# Patient Record
Sex: FEMALE | Race: WHITE | NOT HISPANIC OR LATINO | URBAN - METROPOLITAN AREA
[De-identification: names, ages, dates, MRNs, and addresses within clinical notes are randomized per-mention and may not be internally consistent; named-entity substitution may affect disease eponyms.]

---

## 2017-12-19 ENCOUNTER — OUTPATIENT (OUTPATIENT)
Dept: OUTPATIENT SERVICES | Facility: HOSPITAL | Age: 72
LOS: 1 days | End: 2017-12-19
Payer: MEDICARE

## 2017-12-19 VITALS
SYSTOLIC BLOOD PRESSURE: 124 MMHG | HEART RATE: 66 BPM | RESPIRATION RATE: 16 BRPM | TEMPERATURE: 98 F | WEIGHT: 126.1 LBS | HEIGHT: 60 IN | DIASTOLIC BLOOD PRESSURE: 80 MMHG

## 2017-12-19 DIAGNOSIS — M25.531 PAIN IN RIGHT WRIST: ICD-10-CM

## 2017-12-19 DIAGNOSIS — Z98.890 OTHER SPECIFIED POSTPROCEDURAL STATES: Chronic | ICD-10-CM

## 2017-12-19 DIAGNOSIS — I10 ESSENTIAL (PRIMARY) HYPERTENSION: ICD-10-CM

## 2017-12-19 DIAGNOSIS — M25.539 PAIN IN UNSPECIFIED WRIST: ICD-10-CM

## 2017-12-19 DIAGNOSIS — E03.9 HYPOTHYROIDISM, UNSPECIFIED: ICD-10-CM

## 2017-12-19 LAB
BUN SERPL-MCNC: 26 MG/DL — HIGH (ref 7–23)
CALCIUM SERPL-MCNC: 9.2 MG/DL — SIGNIFICANT CHANGE UP (ref 8.4–10.5)
CHLORIDE SERPL-SCNC: 101 MMOL/L — SIGNIFICANT CHANGE UP (ref 98–107)
CO2 SERPL-SCNC: 26 MMOL/L — SIGNIFICANT CHANGE UP (ref 22–31)
CREAT SERPL-MCNC: 0.93 MG/DL — SIGNIFICANT CHANGE UP (ref 0.5–1.3)
GLUCOSE SERPL-MCNC: 94 MG/DL — SIGNIFICANT CHANGE UP (ref 70–99)
HCT VFR BLD CALC: 37.9 % — SIGNIFICANT CHANGE UP (ref 34.5–45)
HGB BLD-MCNC: 12.4 G/DL — SIGNIFICANT CHANGE UP (ref 11.5–15.5)
MCHC RBC-ENTMCNC: 29.7 PG — SIGNIFICANT CHANGE UP (ref 27–34)
MCHC RBC-ENTMCNC: 32.7 % — SIGNIFICANT CHANGE UP (ref 32–36)
MCV RBC AUTO: 90.7 FL — SIGNIFICANT CHANGE UP (ref 80–100)
NRBC # FLD: 0 — SIGNIFICANT CHANGE UP
PLATELET # BLD AUTO: 245 K/UL — SIGNIFICANT CHANGE UP (ref 150–400)
PMV BLD: 10.8 FL — SIGNIFICANT CHANGE UP (ref 7–13)
POTASSIUM SERPL-MCNC: 4.1 MMOL/L — SIGNIFICANT CHANGE UP (ref 3.5–5.3)
POTASSIUM SERPL-SCNC: 4.1 MMOL/L — SIGNIFICANT CHANGE UP (ref 3.5–5.3)
RBC # BLD: 4.18 M/UL — SIGNIFICANT CHANGE UP (ref 3.8–5.2)
RBC # FLD: 12.5 % — SIGNIFICANT CHANGE UP (ref 10.3–14.5)
SODIUM SERPL-SCNC: 141 MMOL/L — SIGNIFICANT CHANGE UP (ref 135–145)
WBC # BLD: 7.27 K/UL — SIGNIFICANT CHANGE UP (ref 3.8–10.5)
WBC # FLD AUTO: 7.27 K/UL — SIGNIFICANT CHANGE UP (ref 3.8–10.5)

## 2017-12-19 PROCEDURE — 93010 ELECTROCARDIOGRAM REPORT: CPT

## 2017-12-19 NOTE — H&P PST ADULT - NEGATIVE OPHTHALMOLOGIC SYMPTOMS
no diplopia/no pain R/no lacrimation L/no pain L/no photophobia no pain L/no lacrimation L/no lacrimation R/no diplopia/no photophobia/no pain R

## 2017-12-19 NOTE — H&P PST ADULT - NEGATIVE CARDIOVASCULAR SYMPTOMS
no dyspnea on exertion/no claudication/no palpitations/no orthopnea/no paroxysmal nocturnal dyspnea/no peripheral edema/no chest pain

## 2017-12-19 NOTE — H&P PST ADULT - LYMPHATIC
supraclavicular L/supraclavicular R/anterior cervical L/anterior cervical R/posterior cervical L/posterior cervical R

## 2017-12-19 NOTE — H&P PST ADULT - NEGATIVE MUSCULOSKELETAL SYMPTOMS
no joint swelling/no myalgia/no arm pain L/no arthralgia/no muscle cramps/no muscle weakness/no back pain/no leg pain L/no stiffness/no neck pain/no leg pain R

## 2017-12-19 NOTE — H&P PST ADULT - PROBLEM SELECTOR PLAN 1
Pt is scheduled for right distal radius osteotomy on 1/3/18.  Pre op instructions including chlorhexidine wash given and pt able to verbalize understanding.

## 2017-12-19 NOTE — H&P PST ADULT - NEGATIVE NEUROLOGICAL SYMPTOMS
no vertigo/no difficulty walking/no transient paralysis/no generalized seizures/no loss of sensation/no weakness/no paresthesias

## 2017-12-19 NOTE — H&P PST ADULT - HISTORY OF PRESENT ILLNESS
Pt reports she sustained a right wrist fracture 3 years ago, medically treated.  Pt reports she started to experience right wrist pain, progressively getting worse.  Pt reports right wrist xray done, revealed that previous wrist fracture did not heal properly.  Pt is scheduled for right distal radius osteotomy on 1/3/18. 71 yo female with PMH hypertension and hypothyroidism presents to pre surgical testing.  Pt reports she sustained a right wrist fracture 3 years ago, and was medically treated.  Pt reports she started to experience reoccurrence of right wrist pain 6 months ago, progressively getting worse.  Pt reports right wrist xray done, revealed that previous wrist fracture did not heal properly.  Pt is scheduled for right distal radius osteotomy on 1/3/18.

## 2018-01-03 ENCOUNTER — OUTPATIENT (OUTPATIENT)
Dept: OUTPATIENT SERVICES | Facility: HOSPITAL | Age: 73
LOS: 1 days | Discharge: HOME | End: 2018-01-03

## 2018-01-03 VITALS
DIASTOLIC BLOOD PRESSURE: 80 MMHG | WEIGHT: 126.1 LBS | HEART RATE: 66 BPM | SYSTOLIC BLOOD PRESSURE: 124 MMHG | TEMPERATURE: 98 F | OXYGEN SATURATION: 98 % | RESPIRATION RATE: 16 BRPM | HEIGHT: 60 IN

## 2018-01-03 VITALS — HEART RATE: 80 BPM | OXYGEN SATURATION: 99 % | DIASTOLIC BLOOD PRESSURE: 67 MMHG | SYSTOLIC BLOOD PRESSURE: 150 MMHG

## 2018-01-03 DIAGNOSIS — Z98.890 OTHER SPECIFIED POSTPROCEDURAL STATES: Chronic | ICD-10-CM

## 2018-01-03 DIAGNOSIS — M25.531 PAIN IN RIGHT WRIST: ICD-10-CM

## 2018-01-03 RX ORDER — OXYCODONE AND ACETAMINOPHEN 5; 325 MG/1; MG/1
2 TABLET ORAL
Qty: 48 | Refills: 0
Start: 2018-01-03 | End: 2018-01-08

## 2018-01-03 NOTE — ASU DISCHARGE PLAN (ADULT/PEDIATRIC). - NOTIFY
Numbness, color, or temperature change to extremity/Pain not relieved by Medications/Fever greater than 101/Persistent Nausea and Vomiting/Swelling that continues/Bleeding that does not stop

## 2018-01-03 NOTE — BRIEF OPERATIVE NOTE - PRE-OP DX
Radius and ulna distal fracture, right, closed, with malunion, subsequent encounter  01/03/2018    Active  Jad Santamaria

## 2018-01-03 NOTE — BRIEF OPERATIVE NOTE - PROCEDURE
<<-----Click on this checkbox to enter Procedure Radius osteotomy  01/03/2018  RIGHT RADIUS  Active  RROSSI

## 2020-02-04 NOTE — ASU DISCHARGE PLAN (ADULT/PEDIATRIC). - SPECIAL INSTRUCTIONS
[FreeTextEntry1] : 1) CKD IV\par 2) HTN\par 3) DM\par 4) Gout\par 5) Anemia of CKD\par \par Patient has CKD IV in the setting of long standing uncontrolled DM , obesity and HTN\par GN work up negative except for + ISHAN\par Labs reviewed; Scr 2.1 , K+ and serum CO2 wnl\par Hb low; will give UDAY injection next visit\par BP stable; continue Amlodipine and Losartan 100 mg daily\par Needs tight glycemic control and weight loss to halt progression of CKD\par RTC in 6 months
Please refer to Dr. Guajardo instruction sheet

## 2023-01-20 PROBLEM — E03.9 HYPOTHYROIDISM, UNSPECIFIED: Chronic | Status: ACTIVE | Noted: 2017-12-19

## 2023-01-20 PROBLEM — M25.539 PAIN IN UNSPECIFIED WRIST: Chronic | Status: ACTIVE | Noted: 2017-12-19

## 2023-01-20 PROBLEM — I10 ESSENTIAL (PRIMARY) HYPERTENSION: Chronic | Status: ACTIVE | Noted: 2017-12-19

## 2023-01-23 ENCOUNTER — OUTPATIENT (OUTPATIENT)
Dept: OUTPATIENT SERVICES | Facility: HOSPITAL | Age: 78
LOS: 1 days | End: 2023-01-23
Payer: COMMERCIAL

## 2023-01-23 VITALS
RESPIRATION RATE: 18 BRPM | DIASTOLIC BLOOD PRESSURE: 70 MMHG | TEMPERATURE: 98 F | HEART RATE: 57 BPM | HEIGHT: 60 IN | SYSTOLIC BLOOD PRESSURE: 179 MMHG | WEIGHT: 126.1 LBS

## 2023-01-23 DIAGNOSIS — S46.009A UNSPECIFIED INJURY OF MUSCLE(S) AND TENDON(S) OF THE ROTATOR CUFF OF UNSPECIFIED SHOULDER, INITIAL ENCOUNTER: ICD-10-CM

## 2023-01-23 DIAGNOSIS — G56.02 CARPAL TUNNEL SYNDROME, LEFT UPPER LIMB: ICD-10-CM

## 2023-01-23 DIAGNOSIS — Z98.890 OTHER SPECIFIED POSTPROCEDURAL STATES: Chronic | ICD-10-CM

## 2023-01-23 DIAGNOSIS — R52 PAIN, UNSPECIFIED: ICD-10-CM

## 2023-01-23 LAB
ALBUMIN SERPL ELPH-MCNC: 4.5 G/DL — SIGNIFICANT CHANGE UP (ref 3.3–5)
ALP SERPL-CCNC: 34 U/L — LOW (ref 40–120)
ALT FLD-CCNC: 18 U/L — SIGNIFICANT CHANGE UP (ref 4–33)
ANION GAP SERPL CALC-SCNC: 11 MMOL/L — SIGNIFICANT CHANGE UP (ref 7–14)
AST SERPL-CCNC: 24 U/L — SIGNIFICANT CHANGE UP (ref 4–32)
BILIRUB SERPL-MCNC: 0.4 MG/DL — SIGNIFICANT CHANGE UP (ref 0.2–1.2)
BUN SERPL-MCNC: 20 MG/DL — SIGNIFICANT CHANGE UP (ref 7–23)
CALCIUM SERPL-MCNC: 9.5 MG/DL — SIGNIFICANT CHANGE UP (ref 8.4–10.5)
CHLORIDE SERPL-SCNC: 100 MMOL/L — SIGNIFICANT CHANGE UP (ref 98–107)
CO2 SERPL-SCNC: 27 MMOL/L — SIGNIFICANT CHANGE UP (ref 22–31)
CREAT SERPL-MCNC: 0.9 MG/DL — SIGNIFICANT CHANGE UP (ref 0.5–1.3)
EGFR: 65 ML/MIN/1.73M2 — SIGNIFICANT CHANGE UP
GLUCOSE SERPL-MCNC: 100 MG/DL — HIGH (ref 70–99)
HCT VFR BLD CALC: 37.6 % — SIGNIFICANT CHANGE UP (ref 34.5–45)
HGB BLD-MCNC: 12.2 G/DL — SIGNIFICANT CHANGE UP (ref 11.5–15.5)
MCHC RBC-ENTMCNC: 29.9 PG — SIGNIFICANT CHANGE UP (ref 27–34)
MCHC RBC-ENTMCNC: 32.4 GM/DL — SIGNIFICANT CHANGE UP (ref 32–36)
MCV RBC AUTO: 92.2 FL — SIGNIFICANT CHANGE UP (ref 80–100)
NRBC # BLD: 0 /100 WBCS — SIGNIFICANT CHANGE UP (ref 0–0)
NRBC # FLD: 0 K/UL — SIGNIFICANT CHANGE UP (ref 0–0)
PLATELET # BLD AUTO: 239 K/UL — SIGNIFICANT CHANGE UP (ref 150–400)
POTASSIUM SERPL-MCNC: 3.9 MMOL/L — SIGNIFICANT CHANGE UP (ref 3.5–5.3)
POTASSIUM SERPL-SCNC: 3.9 MMOL/L — SIGNIFICANT CHANGE UP (ref 3.5–5.3)
PROT SERPL-MCNC: 7.2 G/DL — SIGNIFICANT CHANGE UP (ref 6–8.3)
RBC # BLD: 4.08 M/UL — SIGNIFICANT CHANGE UP (ref 3.8–5.2)
RBC # FLD: 12.3 % — SIGNIFICANT CHANGE UP (ref 10.3–14.5)
SODIUM SERPL-SCNC: 138 MMOL/L — SIGNIFICANT CHANGE UP (ref 135–145)
WBC # BLD: 5.06 K/UL — SIGNIFICANT CHANGE UP (ref 3.8–10.5)
WBC # FLD AUTO: 5.06 K/UL — SIGNIFICANT CHANGE UP (ref 3.8–10.5)

## 2023-01-23 PROCEDURE — 93010 ELECTROCARDIOGRAM REPORT: CPT

## 2023-01-23 NOTE — H&P PST ADULT - RESPIRATORY
Balbir Pressley)  Neurology  [address] [place] [address]  Phone: [phone]  Fax: [phone]  Follow Up Time:     Renan Sanchez)  Neurosurgery  [facility], [address]  [place] [address]  Phone: [phone]  Fax: [phone]  Follow Up Time:     Meghan Ag ()  Brain Injury Medicine; PhysicalRehab Medicine  [address] [place] [address]  Phone: [phone]  Fax: [phone]  Follow Up Time:
clear to auscultation bilaterally/no wheezes/no rales/no rhonchi

## 2023-01-23 NOTE — H&P PST ADULT - PROBLEM SELECTOR PLAN 1
This is a 78 y/o female who is scheduled for right wrist tendon transfer and tenosynovectomy on 1-30-23  * Ordered covid-19 pcr; given phone number and places that do covid testing to be done 3-5 days before surgery   * Given preop and cleanser instructions with good teach back and patient verbalized understanding   * Instructed to take normal am dose of levothyroxine and lisinopril - HCTZ the am of surgery This is a 78 y/o female who is scheduled for right wrist tendon transfer and tenosynovectomy on 1-30-23--await to hear from surgical coordinator, Fely [phone] to confirm operative procedure.  * Ordered covid-19 pcr; given phone number and places that do covid testing to be done 3-5 days before surgery   * Given preop and cleanser instructions with good teach back and patient verbalized understanding   * Instructed to take normal am dose of levothyroxine and lisinopril - HCTZ the am of surgery This is a 78 y/o female who is scheduled for right wrist tendon transfer and tenosynovectomy on 1-30-23--await to hear from surgical coordinator, Fely [phone] to confirm operative procedure.  * Ordered covid-19 pcr; given phone number and places that do covid testing to be done 3-5 days before surgery   * Given preop and cleanser instructions with good teach back and patient verbalized understanding   * Instructed to take normal am dose of levothyroxine and lisinopril - HCTZ the am of surgery  ADDENDUM: 1-23-23  Spoke with Fely who confirmed patient is having a right wrist tendon transfer and tenosynovectomy on 1-30-23

## 2023-01-23 NOTE — H&P PST ADULT - HISTORY OF PRESENT ILLNESS
This is a 76 y/o female who presents with injury to right hand tendon. Further intervention warranted. Scheduled for right wrist tendon transfer and tenosynovectomy.

## 2023-01-23 NOTE — H&P PST ADULT - NSICDXPASTMEDICALHX_GEN_ALL_CORE_FT
PAST MEDICAL HISTORY:  Hypercholesterolemia     Hypertension     Hypothyroidism     Wrist pain      PAST MEDICAL HISTORY:  Arthritis     History of injury of tendon     Hypercholesterolemia     Hypertension     Hypothyroidism     Wrist pain

## 2023-01-27 VITALS
DIASTOLIC BLOOD PRESSURE: 73 MMHG | HEART RATE: 65 BPM | WEIGHT: 126.1 LBS | OXYGEN SATURATION: 100 % | HEIGHT: 60 IN | TEMPERATURE: 98 F | SYSTOLIC BLOOD PRESSURE: 158 MMHG | RESPIRATION RATE: 18 BRPM

## 2023-01-27 NOTE — ASU PREOPERATIVE ASSESSMENT, ADULT (IPARK ONLY) - FALL HARM RISK - UNIVERSAL INTERVENTIONS
Bed in lowest position, wheels locked, appropriate side rails in place/Call bell, personal items and telephone in reach/Instruct patient to call for assistance before getting out of bed or chair/Non-slip footwear when patient is out of bed/[place] to call system/Physically safe environment - no spills, clutter or unnecessary equipment/Purposeful Proactive Rounding/Room/bathroom lighting operational, light cord in reach

## 2023-01-30 ENCOUNTER — OUTPATIENT (OUTPATIENT)
Dept: OUTPATIENT SERVICES | Facility: HOSPITAL | Age: 78
LOS: 1 days | Discharge: HOME | End: 2023-01-30

## 2023-01-30 VITALS
OXYGEN SATURATION: 99 % | RESPIRATION RATE: 14 BRPM | SYSTOLIC BLOOD PRESSURE: 128 MMHG | HEART RATE: 68 BPM | TEMPERATURE: 97 F | DIASTOLIC BLOOD PRESSURE: 74 MMHG

## 2023-01-30 DIAGNOSIS — Z98.890 OTHER SPECIFIED POSTPROCEDURAL STATES: Chronic | ICD-10-CM

## 2023-01-30 DIAGNOSIS — G56.02 CARPAL TUNNEL SYNDROME, LEFT UPPER LIMB: ICD-10-CM

## 2023-01-30 RX ORDER — ATORVASTATIN CALCIUM 80 MG/1
1 TABLET, FILM COATED ORAL
Qty: 0 | Refills: 0 | DISCHARGE

## 2023-01-30 RX ORDER — LISINOPRIL/HYDROCHLOROTHIAZIDE 10-12.5 MG
1 TABLET ORAL
Qty: 0 | Refills: 0 | DISCHARGE

## 2023-01-30 RX ORDER — LEVOTHYROXINE SODIUM 125 MCG
1 TABLET ORAL
Qty: 0 | Refills: 0 | DISCHARGE

## 2023-01-30 RX ORDER — OXYCODONE AND ACETAMINOPHEN 5; 325 MG/1; MG/1
1 TABLET ORAL
Qty: 42 | Refills: 0
Start: 2023-01-30 | End: 2023-02-05

## 2023-01-30 NOTE — BRIEF OPERATIVE NOTE - NSICDXBRIEFPOSTOP_GEN_ALL_CORE_FT
POST-OP DIAGNOSIS:  Extensor tendon rupture of hand, unspecified laterality, initial encounter 30-Jan-2023 09:50:23  Edin Gusman

## 2023-01-30 NOTE — BRIEF OPERATIVE NOTE - OPERATION/FINDINGS
R hand EDC to long finger reconstruction with EIP autograft, also reenforcement of EPL with FCR autograft

## 2023-01-30 NOTE — ASU DISCHARGE PLAN (ADULT/PEDIATRIC) - NS MD DC FALL RISK RISK
For information on Fall & Injury Prevention, visit: [email or web address] OR  [email or web address] OR  https://www.cdc.gov/steadi/patient.html

## 2023-01-30 NOTE — ASU DISCHARGE PLAN (ADULT/PEDIATRIC) - NURSING INSTRUCTIONS
Progress to regular diet as tolerated.  Keep well hydrated.   When taking pain/narcotic medications - take with food as it can cause nausea if taken on an empty stomach, and know it may cause constipation. To prevent constipation increase fluids and fiber in diet. You may take stool softeners (such as Colace) and follow directions as printed on bottle. - Do NOT drive while on narcotics.   Next dose of Tylenol may be taken at 2:30pm

## 2024-11-26 NOTE — H&P PST ADULT - CARDIOVASCULAR DETAILS
Age: 85 years old or older/Coagulation: Bleeding disorder either through use of anticoagulants or underlying clinical condition(s)
positive S1/positive S2

## 2025-02-06 PROBLEM — Z00.00 ENCOUNTER FOR PREVENTIVE HEALTH EXAMINATION: Status: ACTIVE | Noted: 2025-02-06

## 2025-03-12 ENCOUNTER — APPOINTMENT (OUTPATIENT)
Age: 80
End: 2025-03-12

## 2025-04-16 ENCOUNTER — APPOINTMENT (OUTPATIENT)
Age: 80
End: 2025-04-16
Payer: MEDICARE

## 2025-04-16 ENCOUNTER — NON-APPOINTMENT (OUTPATIENT)
Age: 80
End: 2025-04-16

## 2025-04-16 PROCEDURE — 92134 CPTRZ OPH DX IMG PST SGM RTA: CPT

## 2025-04-16 PROCEDURE — 92202 OPSCPY EXTND ON/MAC DRAW: CPT

## 2025-04-16 PROCEDURE — 92014 COMPRE OPH EXAM EST PT 1/>: CPT

## 2025-05-05 DIAGNOSIS — M81.0 AGE-RELATED OSTEOPOROSIS W/OUT CURRENT PATHOLOGICAL FRACTURE: ICD-10-CM

## 2025-06-16 ENCOUNTER — APPOINTMENT (OUTPATIENT)
Dept: RHEUMATOLOGY | Facility: CLINIC | Age: 80
End: 2025-06-16

## 2025-06-18 ENCOUNTER — APPOINTMENT (OUTPATIENT)
Dept: RHEUMATOLOGY | Facility: CLINIC | Age: 80
End: 2025-06-18
Payer: MEDICARE

## 2025-06-18 VITALS
DIASTOLIC BLOOD PRESSURE: 67 MMHG | HEART RATE: 78 BPM | TEMPERATURE: 98.7 F | SYSTOLIC BLOOD PRESSURE: 146 MMHG | OXYGEN SATURATION: 95 %

## 2025-06-18 VITALS — SYSTOLIC BLOOD PRESSURE: 128 MMHG | DIASTOLIC BLOOD PRESSURE: 67 MMHG | OXYGEN SATURATION: 99 % | HEART RATE: 69 BPM

## 2025-06-18 PROCEDURE — 96374 THER/PROPH/DIAG INJ IV PUSH: CPT

## (undated) DEVICE — VENODYNE/SCD SLEEVE CALF MEDIUM

## (undated) DEVICE — DRSG DERMABOND 0.7ML

## (undated) DEVICE — DRSG CURITY GAUZE SPONGE 4 X 4" 12-PLY

## (undated) DEVICE — SUT MONOCRYL 5-0 18" P-3 UNDYED

## (undated) DEVICE — SUT FIBERWIRE 4-0 18" BLUE AP NDL12.3 MM 3/8 CIRCLE

## (undated) DEVICE — SOL IRR POUR NS 0.9% 500ML

## (undated) DEVICE — DRSG STERISTRIPS 0.25 X 3"

## (undated) DEVICE — GLV 8 PROTEXIS (WHITE)

## (undated) DEVICE — TOURNIQUET CUFF 18" DUAL PORT SINGLE BLADDER LUER LOCK (BLACK)

## (undated) DEVICE — WARMING BLANKET LOWER ADULT

## (undated) DEVICE — PACK HAND TRAY

## (undated) DEVICE — POSITIONER STRAP ARMBOARD VELCRO TS-30

## (undated) DEVICE — DRSG COBAN 2" LF STERILE